# Patient Record
Sex: MALE | Race: WHITE | NOT HISPANIC OR LATINO
[De-identification: names, ages, dates, MRNs, and addresses within clinical notes are randomized per-mention and may not be internally consistent; named-entity substitution may affect disease eponyms.]

---

## 2017-12-23 ENCOUNTER — TRANSCRIPTION ENCOUNTER (OUTPATIENT)
Age: 52
End: 2017-12-23

## 2021-08-23 ENCOUNTER — FORM ENCOUNTER (OUTPATIENT)
Age: 56
End: 2021-08-23

## 2021-08-24 ENCOUNTER — TRANSCRIPTION ENCOUNTER (OUTPATIENT)
Age: 56
End: 2021-08-24

## 2021-09-13 ENCOUNTER — TRANSCRIPTION ENCOUNTER (OUTPATIENT)
Age: 56
End: 2021-09-13

## 2023-05-22 ENCOUNTER — NON-APPOINTMENT (OUTPATIENT)
Age: 58
End: 2023-05-22

## 2023-05-23 ENCOUNTER — INPATIENT (INPATIENT)
Facility: HOSPITAL | Age: 58
LOS: 2 days | Discharge: HOME | DRG: 638 | End: 2023-05-26
Attending: FAMILY MEDICINE | Admitting: FAMILY MEDICINE
Payer: COMMERCIAL

## 2023-05-23 VITALS
DIASTOLIC BLOOD PRESSURE: 79 MMHG | TEMPERATURE: 100 F | WEIGHT: 259.93 LBS | RESPIRATION RATE: 16 BRPM | HEART RATE: 73 BPM | SYSTOLIC BLOOD PRESSURE: 139 MMHG | OXYGEN SATURATION: 94 % | HEIGHT: 71 IN

## 2023-05-23 LAB
ALBUMIN SERPL ELPH-MCNC: 3.2 G/DL — LOW (ref 3.3–5)
ALP SERPL-CCNC: 65 U/L — SIGNIFICANT CHANGE UP (ref 40–120)
ALT FLD-CCNC: 41 U/L — SIGNIFICANT CHANGE UP (ref 12–78)
ANION GAP SERPL CALC-SCNC: 6 MMOL/L — SIGNIFICANT CHANGE UP (ref 5–17)
APPEARANCE UR: CLEAR — SIGNIFICANT CHANGE UP
APTT BLD: 28.8 SEC — SIGNIFICANT CHANGE UP (ref 27.5–35.5)
AST SERPL-CCNC: 26 U/L — SIGNIFICANT CHANGE UP (ref 15–37)
BASOPHILS # BLD AUTO: 0.07 K/UL — SIGNIFICANT CHANGE UP (ref 0–0.2)
BASOPHILS NFR BLD AUTO: 0.6 % — SIGNIFICANT CHANGE UP (ref 0–2)
BILIRUB SERPL-MCNC: 0.5 MG/DL — SIGNIFICANT CHANGE UP (ref 0.2–1.2)
BILIRUB UR-MCNC: NEGATIVE — SIGNIFICANT CHANGE UP
BUN SERPL-MCNC: 20 MG/DL — SIGNIFICANT CHANGE UP (ref 7–23)
CALCIUM SERPL-MCNC: 9.2 MG/DL — SIGNIFICANT CHANGE UP (ref 8.5–10.1)
CHLORIDE SERPL-SCNC: 105 MMOL/L — SIGNIFICANT CHANGE UP (ref 96–108)
CO2 SERPL-SCNC: 24 MMOL/L — SIGNIFICANT CHANGE UP (ref 22–31)
COLOR SPEC: YELLOW — SIGNIFICANT CHANGE UP
CREAT SERPL-MCNC: 1.1 MG/DL — SIGNIFICANT CHANGE UP (ref 0.5–1.3)
DIFF PNL FLD: ABNORMAL
EGFR: 79 ML/MIN/1.73M2 — SIGNIFICANT CHANGE UP
EOSINOPHIL # BLD AUTO: 0.2 K/UL — SIGNIFICANT CHANGE UP (ref 0–0.5)
EOSINOPHIL NFR BLD AUTO: 1.8 % — SIGNIFICANT CHANGE UP (ref 0–6)
GLUCOSE SERPL-MCNC: 206 MG/DL — HIGH (ref 70–99)
GLUCOSE UR QL: NEGATIVE — SIGNIFICANT CHANGE UP
HCT VFR BLD CALC: 42.8 % — SIGNIFICANT CHANGE UP (ref 39–50)
HGB BLD-MCNC: 14 G/DL — SIGNIFICANT CHANGE UP (ref 13–17)
IMM GRANULOCYTES NFR BLD AUTO: 1.2 % — HIGH (ref 0–0.9)
INR BLD: 1.18 RATIO — HIGH (ref 0.88–1.16)
KETONES UR-MCNC: NEGATIVE — SIGNIFICANT CHANGE UP
LACTATE SERPL-SCNC: 1.8 MMOL/L — SIGNIFICANT CHANGE UP (ref 0.7–2)
LEUKOCYTE ESTERASE UR-ACNC: NEGATIVE — SIGNIFICANT CHANGE UP
LYMPHOCYTES # BLD AUTO: 1.93 K/UL — SIGNIFICANT CHANGE UP (ref 1–3.3)
LYMPHOCYTES # BLD AUTO: 17.2 % — SIGNIFICANT CHANGE UP (ref 13–44)
MCHC RBC-ENTMCNC: 27.7 PG — SIGNIFICANT CHANGE UP (ref 27–34)
MCHC RBC-ENTMCNC: 32.7 GM/DL — SIGNIFICANT CHANGE UP (ref 32–36)
MCV RBC AUTO: 84.8 FL — SIGNIFICANT CHANGE UP (ref 80–100)
MONOCYTES # BLD AUTO: 1.49 K/UL — HIGH (ref 0–0.9)
MONOCYTES NFR BLD AUTO: 13.3 % — SIGNIFICANT CHANGE UP (ref 2–14)
NEUTROPHILS # BLD AUTO: 7.4 K/UL — SIGNIFICANT CHANGE UP (ref 1.8–7.4)
NEUTROPHILS NFR BLD AUTO: 65.9 % — SIGNIFICANT CHANGE UP (ref 43–77)
NITRITE UR-MCNC: NEGATIVE — SIGNIFICANT CHANGE UP
NRBC # BLD: 0 /100 WBCS — SIGNIFICANT CHANGE UP (ref 0–0)
PH UR: 6 — SIGNIFICANT CHANGE UP (ref 5–8)
PLATELET # BLD AUTO: 221 K/UL — SIGNIFICANT CHANGE UP (ref 150–400)
POTASSIUM SERPL-MCNC: 3.7 MMOL/L — SIGNIFICANT CHANGE UP (ref 3.5–5.3)
POTASSIUM SERPL-SCNC: 3.7 MMOL/L — SIGNIFICANT CHANGE UP (ref 3.5–5.3)
PROT SERPL-MCNC: 7.7 G/DL — SIGNIFICANT CHANGE UP (ref 6–8.3)
PROT UR-MCNC: 15
PROTHROM AB SERPL-ACNC: 13.8 SEC — HIGH (ref 10.5–13.4)
RBC # BLD: 5.05 M/UL — SIGNIFICANT CHANGE UP (ref 4.2–5.8)
RBC # FLD: 15.2 % — HIGH (ref 10.3–14.5)
SODIUM SERPL-SCNC: 135 MMOL/L — SIGNIFICANT CHANGE UP (ref 135–145)
SP GR SPEC: 1.01 — SIGNIFICANT CHANGE UP (ref 1.01–1.02)
UROBILINOGEN FLD QL: NEGATIVE — SIGNIFICANT CHANGE UP
WBC # BLD: 11.22 K/UL — HIGH (ref 3.8–10.5)
WBC # FLD AUTO: 11.22 K/UL — HIGH (ref 3.8–10.5)

## 2023-05-23 PROCEDURE — 99285 EMERGENCY DEPT VISIT HI MDM: CPT

## 2023-05-23 PROCEDURE — 71045 X-RAY EXAM CHEST 1 VIEW: CPT | Mod: 26

## 2023-05-23 PROCEDURE — 73590 X-RAY EXAM OF LOWER LEG: CPT | Mod: 26,RT

## 2023-05-23 PROCEDURE — 93971 EXTREMITY STUDY: CPT | Mod: 26,RT

## 2023-05-23 RX ORDER — CEFEPIME 1 G/1
2000 INJECTION, POWDER, FOR SOLUTION INTRAMUSCULAR; INTRAVENOUS ONCE
Refills: 0 | Status: COMPLETED | OUTPATIENT
Start: 2023-05-23 | End: 2023-05-23

## 2023-05-23 RX ORDER — ACETAMINOPHEN 500 MG
1000 TABLET ORAL ONCE
Refills: 0 | Status: COMPLETED | OUTPATIENT
Start: 2023-05-23 | End: 2023-05-23

## 2023-05-23 RX ORDER — SODIUM CHLORIDE 9 MG/ML
2300 INJECTION INTRAMUSCULAR; INTRAVENOUS; SUBCUTANEOUS ONCE
Refills: 0 | Status: COMPLETED | OUTPATIENT
Start: 2023-05-23 | End: 2023-05-23

## 2023-05-23 RX ADMIN — SODIUM CHLORIDE 2300 MILLILITER(S): 9 INJECTION INTRAMUSCULAR; INTRAVENOUS; SUBCUTANEOUS at 20:10

## 2023-05-23 RX ADMIN — Medication 400 MILLIGRAM(S): at 20:10

## 2023-05-23 NOTE — ED ADULT NURSE NOTE - OBJECTIVE STATEMENT
pt a/o x 4 with a calm affect c/o increasing redness and swelling to lower right extremity over 2 days.  patient states he scratched his ankle and the redness and swelling went from there.

## 2023-05-23 NOTE — ED ADULT NURSE NOTE - CADM POA PRESS ULCER
[phone]  d/w Dr. Narvaez and Dr. NORM Stout No

## 2023-05-23 NOTE — ED ADULT TRIAGE NOTE - CHIEF COMPLAINT QUOTE
Patient is a 58yo male complaining of right leg redness and pain Patient states I think I have cellulitis Patient has history of DM2 Patient has bee running a fever and has increasing redness on right leg

## 2023-05-23 NOTE — ED ADULT NURSE NOTE - NSFALLUNIVINTERV_ED_ALL_ED
Bed/Stretcher in lowest position, wheels locked, appropriate side rails in place/Call bell, personal items and telephone in reach/Instruct patient to call for assistance before getting out of bed/chair/stretcher/Non-slip footwear applied when patient is off stretcher/Marty to call system/Physically safe environment - no spills, clutter or unnecessary equipment/Purposeful proactive rounding/Room/bathroom lighting operational, light cord in reach

## 2023-05-23 NOTE — ED PROVIDER NOTE - NS ED ATTENDING STATEMENT MOD
This was a shared visit with the ENOCH. I reviewed and verified the documentation and independently performed the documented:

## 2023-05-23 NOTE — ED PROVIDER NOTE - CLINICAL SUMMARY MEDICAL DECISION MAKING FREE TEXT BOX
here with rapidly progressive cellulitis with fevers, chills. diabetic. check labs, US/XR, give antibiotics, admit.

## 2023-05-23 NOTE — ED PROVIDER NOTE - OBJECTIVE STATEMENT
57 M hx DM, HTN c/o right lower leg pain, redness. Sent from urgent care due to cellulitis. States for the past few days he had low grade fever, yesterday saw a small red spot by his right medial ankle which spread. Has been taking naproxen, last dose this morning. Admits that a few days ago he scraped his ankle on a box. 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

## 2023-05-23 NOTE — ED PROVIDER NOTE - ATTENDING APP SHARED VISIT CONTRIBUTION OF CARE
This was a shared visit with ENOCH. I reviewed and verified the documentation and independently performed the documented MDM.

## 2023-05-24 DIAGNOSIS — E78.00 PURE HYPERCHOLESTEROLEMIA, UNSPECIFIED: ICD-10-CM

## 2023-05-24 DIAGNOSIS — L03.90 CELLULITIS, UNSPECIFIED: ICD-10-CM

## 2023-05-24 DIAGNOSIS — F41.9 ANXIETY DISORDER, UNSPECIFIED: ICD-10-CM

## 2023-05-24 DIAGNOSIS — I10 ESSENTIAL (PRIMARY) HYPERTENSION: ICD-10-CM

## 2023-05-24 DIAGNOSIS — E11.9 TYPE 2 DIABETES MELLITUS WITHOUT COMPLICATIONS: ICD-10-CM

## 2023-05-24 LAB
A1C WITH ESTIMATED AVERAGE GLUCOSE RESULT: 8.3 % — HIGH (ref 4–5.6)
CRP SERPL-MCNC: 106 MG/L — HIGH
ERYTHROCYTE [SEDIMENTATION RATE] IN BLOOD: 26 MM/HR — HIGH (ref 0–20)
ESTIMATED AVERAGE GLUCOSE: 192 MG/DL — HIGH (ref 68–114)
HCT VFR BLD CALC: 42.3 % — SIGNIFICANT CHANGE UP (ref 39–50)
HGB BLD-MCNC: 13.6 G/DL — SIGNIFICANT CHANGE UP (ref 13–17)
MCHC RBC-ENTMCNC: 27.4 PG — SIGNIFICANT CHANGE UP (ref 27–34)
MCHC RBC-ENTMCNC: 32.2 GM/DL — SIGNIFICANT CHANGE UP (ref 32–36)
MCV RBC AUTO: 85.1 FL — SIGNIFICANT CHANGE UP (ref 80–100)
NRBC # BLD: 0 /100 WBCS — SIGNIFICANT CHANGE UP (ref 0–0)
PLATELET # BLD AUTO: 202 K/UL — SIGNIFICANT CHANGE UP (ref 150–400)
PROCALCITONIN SERPL-MCNC: 0.08 NG/ML — SIGNIFICANT CHANGE UP
RBC # BLD: 4.97 M/UL — SIGNIFICANT CHANGE UP (ref 4.2–5.8)
RBC # FLD: 15.4 % — HIGH (ref 10.3–14.5)
WBC # BLD: 10.91 K/UL — HIGH (ref 3.8–10.5)
WBC # FLD AUTO: 10.91 K/UL — HIGH (ref 3.8–10.5)

## 2023-05-24 PROCEDURE — 93010 ELECTROCARDIOGRAM REPORT: CPT

## 2023-05-24 PROCEDURE — 99222 1ST HOSP IP/OBS MODERATE 55: CPT

## 2023-05-24 RX ORDER — DEXTROSE 50 % IN WATER 50 %
25 SYRINGE (ML) INTRAVENOUS ONCE
Refills: 0 | Status: DISCONTINUED | OUTPATIENT
Start: 2023-05-24 | End: 2023-05-26

## 2023-05-24 RX ORDER — TRAMADOL HYDROCHLORIDE 50 MG/1
50 TABLET ORAL
Refills: 0 | Status: DISCONTINUED | OUTPATIENT
Start: 2023-05-24 | End: 2023-05-26

## 2023-05-24 RX ORDER — ACETAMINOPHEN 500 MG
650 TABLET ORAL EVERY 6 HOURS
Refills: 0 | Status: DISCONTINUED | OUTPATIENT
Start: 2023-05-24 | End: 2023-05-26

## 2023-05-24 RX ORDER — LACTOBACILLUS ACIDOPHILUS 100MM CELL
1 CAPSULE ORAL DAILY
Refills: 0 | Status: DISCONTINUED | OUTPATIENT
Start: 2023-05-24 | End: 2023-05-26

## 2023-05-24 RX ORDER — ALPRAZOLAM 0.25 MG
0.5 TABLET ORAL EVERY 8 HOURS
Refills: 0 | Status: DISCONTINUED | OUTPATIENT
Start: 2023-05-24 | End: 2023-05-26

## 2023-05-24 RX ORDER — LISINOPRIL 2.5 MG/1
2.5 TABLET ORAL DAILY
Refills: 0 | Status: DISCONTINUED | OUTPATIENT
Start: 2023-05-24 | End: 2023-05-24

## 2023-05-24 RX ORDER — LANOLIN ALCOHOL/MO/W.PET/CERES
5 CREAM (GRAM) TOPICAL AT BEDTIME
Refills: 0 | Status: DISCONTINUED | OUTPATIENT
Start: 2023-05-24 | End: 2023-05-26

## 2023-05-24 RX ORDER — DEXTROSE 50 % IN WATER 50 %
12.5 SYRINGE (ML) INTRAVENOUS ONCE
Refills: 0 | Status: DISCONTINUED | OUTPATIENT
Start: 2023-05-24 | End: 2023-05-26

## 2023-05-24 RX ORDER — LOSARTAN POTASSIUM 100 MG/1
25 TABLET, FILM COATED ORAL DAILY
Refills: 0 | Status: DISCONTINUED | OUTPATIENT
Start: 2023-05-25 | End: 2023-05-26

## 2023-05-24 RX ORDER — DEXTROSE 50 % IN WATER 50 %
15 SYRINGE (ML) INTRAVENOUS ONCE
Refills: 0 | Status: DISCONTINUED | OUTPATIENT
Start: 2023-05-24 | End: 2023-05-26

## 2023-05-24 RX ORDER — INSULIN LISPRO 100/ML
VIAL (ML) SUBCUTANEOUS AT BEDTIME
Refills: 0 | Status: DISCONTINUED | OUTPATIENT
Start: 2023-05-24 | End: 2023-05-26

## 2023-05-24 RX ORDER — TRAMADOL HYDROCHLORIDE 50 MG/1
25 TABLET ORAL
Refills: 0 | Status: DISCONTINUED | OUTPATIENT
Start: 2023-05-24 | End: 2023-05-26

## 2023-05-24 RX ORDER — ATENOLOL 25 MG/1
25 TABLET ORAL DAILY
Refills: 0 | Status: DISCONTINUED | OUTPATIENT
Start: 2023-05-24 | End: 2023-05-26

## 2023-05-24 RX ORDER — INSULIN LISPRO 100/ML
VIAL (ML) SUBCUTANEOUS
Refills: 0 | Status: DISCONTINUED | OUTPATIENT
Start: 2023-05-24 | End: 2023-05-26

## 2023-05-24 RX ORDER — GLUCAGON INJECTION, SOLUTION 0.5 MG/.1ML
1 INJECTION, SOLUTION SUBCUTANEOUS ONCE
Refills: 0 | Status: DISCONTINUED | OUTPATIENT
Start: 2023-05-24 | End: 2023-05-26

## 2023-05-24 RX ORDER — CEFAZOLIN SODIUM 1 G
1000 VIAL (EA) INJECTION EVERY 8 HOURS
Refills: 0 | Status: DISCONTINUED | OUTPATIENT
Start: 2023-05-24 | End: 2023-05-26

## 2023-05-24 RX ORDER — HEPARIN SODIUM 5000 [USP'U]/ML
5000 INJECTION INTRAVENOUS; SUBCUTANEOUS EVERY 12 HOURS
Refills: 0 | Status: DISCONTINUED | OUTPATIENT
Start: 2023-05-24 | End: 2023-05-25

## 2023-05-24 RX ORDER — ASPIRIN/CALCIUM CARB/MAGNESIUM 324 MG
81 TABLET ORAL DAILY
Refills: 0 | Status: DISCONTINUED | OUTPATIENT
Start: 2023-05-24 | End: 2023-05-26

## 2023-05-24 RX ORDER — ATORVASTATIN CALCIUM 80 MG/1
40 TABLET, FILM COATED ORAL AT BEDTIME
Refills: 0 | Status: DISCONTINUED | OUTPATIENT
Start: 2023-05-24 | End: 2023-05-26

## 2023-05-24 RX ADMIN — LISINOPRIL 2.5 MILLIGRAM(S): 2.5 TABLET ORAL at 06:42

## 2023-05-24 RX ADMIN — Medication 1: at 08:39

## 2023-05-24 RX ADMIN — ATORVASTATIN CALCIUM 40 MILLIGRAM(S): 80 TABLET, FILM COATED ORAL at 22:19

## 2023-05-24 RX ADMIN — Medication 81 MILLIGRAM(S): at 11:50

## 2023-05-24 RX ADMIN — Medication 100 MILLIGRAM(S): at 09:08

## 2023-05-24 RX ADMIN — Medication 0.5 MILLIGRAM(S): at 22:19

## 2023-05-24 RX ADMIN — HEPARIN SODIUM 5000 UNIT(S): 5000 INJECTION INTRAVENOUS; SUBCUTANEOUS at 17:03

## 2023-05-24 RX ADMIN — ATENOLOL 25 MILLIGRAM(S): 25 TABLET ORAL at 17:02

## 2023-05-24 RX ADMIN — Medication 1: at 17:24

## 2023-05-24 RX ADMIN — Medication 5 MILLIGRAM(S): at 02:00

## 2023-05-24 RX ADMIN — Medication 1 TABLET(S): at 11:06

## 2023-05-24 RX ADMIN — CEFEPIME 100 MILLIGRAM(S): 1 INJECTION, POWDER, FOR SOLUTION INTRAMUSCULAR; INTRAVENOUS at 01:02

## 2023-05-24 RX ADMIN — Medication 100 MILLIGRAM(S): at 17:05

## 2023-05-24 NOTE — CARE COORDINATION ASSESSMENT. - NSCAREPROVIDERS_GEN_ALL_CORE_FT
CARE PROVIDERS:  Accepting Physician: Zander Ma  Access Services: Jose De Jesus Fry  Admitting: Zander Ma  Attending: Zander Ma  Case Management: Stephen Shrestha  Consultant: Perlman, Craig  Consultant: Georgina Montes De Oca  Consultant: Weil, Patricia  Consultant: Perlman, Daryl  Consultant: Claire Serna  Consultant: Jabier Quiles  Covering Nurse: Frank Whitman  Covering Team: Chelsey Wang  Covering Team: Perlman, Daryl  ED ACP: Aleyda Arora  ED Attending: Louann Andrews  ED Nurse: Tone Nobles  Nurse: Shelia Crowe  Nurse: Rosa Newby  Nurse: Mat Vieyra  Nurse: Nicole Reynolds  Ordered: ADM, User  Ordered: Doctor, Unknown  Ordered: ServiceAccount, SCMMLM  Ordered: ServiceAccount, SCMMLM  Outpatient Provider: Leonidas Young  Override: Shelia Crowe  Override: Rosa Newby  PCA/Nursing Assistant: Laura Velazco  Primary Team: Zander Ma  Registered Dietitian: Megan Santa  : Dona Love

## 2023-05-24 NOTE — CONSULT NOTE ADULT - SUBJECTIVE AND OBJECTIVE BOX
[facility]  INFECTIOUS DISEASES   [address] [place] [address]  Tel: [phone]     Fax: [phone]  ========================================================  MD Alvin Acevedo Kaushal, MD Cho, Michelle, MD Sunjit, Jaspal, MD  ========================================================    [id number]  KAREN VAZ     CC: Patient is a 57y old  Male who presents with a chief complaint of   HPI:    PAST MEDICAL & SURGICAL HISTORY:    Social Hx: No smoking, EtOH or drugs     FAMILY HISTORY: Noncontributory     Allergies  No Known Allergies    Antibiotics:  MEDICATIONS  (STANDING):  ceFAZolin   IVPB 1000 milliGRAM(s) IV Intermittent every 8 hours  dextrose 50% Injectable 25 Gram(s) IV Push once  dextrose 50% Injectable 12.5 Gram(s) IV Push once  dextrose 50% Injectable 25 Gram(s) IV Push once  dextrose Oral Gel 15 Gram(s) Oral once  glucagon  Injectable 1 milliGRAM(s) IntraMuscular once  heparin   Injectable 5000 Unit(s) SubCutaneous every 12 hours  insulin lispro (ADMELOG) corrective regimen sliding scale   SubCutaneous three times a day before meals  insulin lispro (ADMELOG) corrective regimen sliding scale   SubCutaneous at bedtime  lactobacillus acidophilus 1 Tablet(s) Oral daily  lisinopril 2.5 milliGRAM(s) Oral daily  melatonin 5 milliGRAM(s) Oral at bedtime    MEDICATIONS  (PRN):  acetaminophen     Tablet .. 650 milliGRAM(s) Oral every 6 hours PRN Temp greater or equal to 38C (100.4F)  traMADol 25 milliGRAM(s) Oral four times a day PRN Mild Pain (1 - 3)  traMADol 50 milliGRAM(s) Oral four times a day PRN Moderate Pain (4 - 6)     REVIEW OF SYSTEMS:  CONSTITUTIONAL:  No Fever or chills  HEENT:  No diplopia or blurred vision.  No sore throat or runny nose.  CARDIOVASCULAR:  No chest pain or SOB.  RESPIRATORY:  No cough, shortness of breath, PND or orthopnea.  GASTROINTESTINAL:  No nausea, vomiting or diarrhea.  GENITOURINARY:  No dysuria, frequency or urgency. No Blood in urine  MUSCULOSKELETAL:  no joint aches, no muscle pain  SKIN:  No change in skin, hair or nails.  NEUROLOGIC:  No paresthesias or weakness.  PSYCHIATRIC:  No disorder of thought or mood.  ENDOCRINE:  No heat or cold intolerance, polyuria or polydipsia.  HEMATOLOGICAL:  No easy bruising or bleeding.     Physical Exam:  Vital Signs Last 24 Hrs  T(C): 36.8 (24 May 2023 04:47), Max: 37.8 (23 May 2023 17:41)  T(F): 98.2 (24 May 2023 04:47), Max: 100.1 (23 May 2023 17:41)  HR: 62 (24 May 2023 04:47) (62 - 76)  BP: 124/75 (24 May 2023 04:47) (124/75 - 143/99)  RR: 16 (24 May 2023 04:47) (16 - 16)  SpO2: 93% (24 May 2023 04:47) (93% - 98%)  Parameters below as of 24 May 2023 04:47  Patient On (Oxygen Delivery Method): room air  Height (cm): 180.3 ( @ 17:41)  Weight (kg): 117.9 ( @ 17:41)  BMI (kg/m2): 36.3 ( @ 17:41)  BSA (m2): 2.36 ( @ 17:41)  GEN: NAD  HEENT: normocephalic and atraumatic. EOMI. PERRL.    NECK: Supple.  No lymphadenopathy   LUNGS: Clear to auscultation.  HEART: Regular rate and rhythm without murmur.  ABDOMEN: Soft, nontender, and nondistended.  Positive bowel sounds.    : No CVA tenderness  EXTREMITIES: Without edema.  NEUROLOGIC: grossly intact.  PSYCHIATRIC: Appropriate affect .  SKIN: No rash     Labs:      135  |  105  |  20  ----------------------------<  206<H>  3.7   |  24  |  1.10    Ca    9.2      23 May 2023 19:58    TPro  7.7  /  Alb  3.2<L>  /  TBili  0.5  /  DBili  x   /  AST  26  /  ALT  41  /  AlkPhos  65                          13.6   10.91 )-----------( 202      ( 24 May 2023 06:25 )             42.3     PT/INR - ( 23 May 2023 19:58 )   PT: 13.8 sec;   INR: 1.18 ratio    PTT - ( 23 May 2023 19:58 )  PTT:28.8 sec  Urinalysis Basic - ( 23 May 2023 22:30 )    Color: Yellow / Appearance: Clear / S.010 / pH: x  Gluc: x / Ketone: Negative  / Bili: Negative / Urobili: Negative   Blood: x / Protein: 15 / Nitrite: Negative   Leuk Esterase: Negative / RBC: 3-5 /HPF / WBC 0-2   Sq Epi: x / Non Sq Epi: x / Bacteria: Occasional    LIVER FUNCTIONS - ( 23 May 2023 19:58 )  Alb: 3.2 g/dL / Pro: 7.7 g/dL / ALK PHOS: 65 U/L / ALT: 41 U/L / AST: 26 U/L / GGT: x           Procalcitonin, Serum: 0.08 ng/mL (23 @ 06:25)    Sedimentation Rate, Erythrocyte: 26 mm/hr (23 @ 06:25)    All imaging and other data have been reviewed.  < from: US Duplex Venous Lower Ext Ltd, Right (23 @ 21:56) >  IMPRESSION:  No evidence of right lower extremity deep venous thrombosis.    Assessment and Plan:       Thank you for courtesy of this consult.     Will follow.  Discussed with the primary team.     Jabier Quiles MD  Division of Infectious Diseases   Please call ID service at [phone] with any question.    75 minutes spent on total encounter assessing patient, examination, chart review, counseling and coordinating care by the attending physician/nurse/care manager.   [facility]  INFECTIOUS DISEASES   [address] [place] [address]  Tel: [phone]     Fax: [phone]  ========================================================  MD Alvin Acevedo Kaushal, MD Cho, Michelle, MD Sunjit, Jaspal, MD  ========================================================    [id number]  KAREN VAZ     CC: right leg swelling and redness   HPI: 58yo man with PMH of HTN and DM2 was admitted on  with right lower leg cellulitis.   He had small red spot in right inner ankle about 3-4 days ago that started spreading, no trauma or any open wound. Also had low grade fever in last few days. No other symptoms.  No chest pain, cough, headache, nausea, vomiting, diarrhea, dysuria or any other symptoms. Never had cellulitis in his leg, never been diagnosed with MRSA in the past.  In last few weeks noticed redness and irritation in inguinal area "jock itch" for which not using any treatment. No other skin lesions.   In ED was started on cefazolin and ID was called for further recommendations.     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, social EtOH, no drugs, owns a  company     FAMILY HISTORY: Noncontributory     Allergies  No Known Allergies    Antibiotics:  MEDICATIONS  (STANDING):  ceFAZolin   IVPB 1000 milliGRAM(s) IV Intermittent every 8 hours  dextrose 50% Injectable 25 Gram(s) IV Push once  dextrose 50% Injectable 12.5 Gram(s) IV Push once  dextrose 50% Injectable 25 Gram(s) IV Push once  dextrose Oral Gel 15 Gram(s) Oral once  glucagon  Injectable 1 milliGRAM(s) IntraMuscular once  heparin   Injectable 5000 Unit(s) SubCutaneous every 12 hours  insulin lispro (ADMELOG) corrective regimen sliding scale   SubCutaneous three times a day before meals  insulin lispro (ADMELOG) corrective regimen sliding scale   SubCutaneous at bedtime  lactobacillus acidophilus 1 Tablet(s) Oral daily  lisinopril 2.5 milliGRAM(s) Oral daily  melatonin 5 milliGRAM(s) Oral at bedtime    MEDICATIONS  (PRN):  acetaminophen     Tablet .. 650 milliGRAM(s) Oral every 6 hours PRN Temp greater or equal to 38C (100.4F)  traMADol 25 milliGRAM(s) Oral four times a day PRN Mild Pain (1 - 3)  traMADol 50 milliGRAM(s) Oral four times a day PRN Moderate Pain (4 - 6)     REVIEW OF SYSTEMS:  CONSTITUTIONAL:  No Fever or chills  HEENT:  No diplopia or blurred vision.  No sore throat or runny nose.  CARDIOVASCULAR:  No chest pain or SOB.  RESPIRATORY:  No cough, shortness of breath, PND or orthopnea.  GASTROINTESTINAL:  No nausea, vomiting or diarrhea.  GENITOURINARY:  No dysuria, frequency or urgency. No Blood in urine  MUSCULOSKELETAL:  no joint aches, no muscle pain  SKIN:  No change in skin, hair or nails.  NEUROLOGIC:  No paresthesias or weakness.  PSYCHIATRIC:  No disorder of thought or mood.  ENDOCRINE:  No heat or cold intolerance, polyuria or polydipsia.  HEMATOLOGICAL:  No easy bruising or bleeding.     Physical Exam:  Vital Signs Last 24 Hrs  T(C): 36.8 (24 May 2023 04:47), Max: 37.8 (23 May 2023 17:41)  T(F): 98.2 (24 May 2023 04:47), Max: 100.1 (23 May 2023 17:41)  HR: 62 (24 May 2023 04:47) (62 - 76)  BP: 124/75 (24 May 2023 04:47) (124/75 - 143/99)  RR: 16 (24 May 2023 04:47) (16 - 16)  SpO2: 93% (24 May 2023 04:47) (93% - 98%)  Parameters below as of 24 May 2023 04:47  Patient On (Oxygen Delivery Method): room air  Height (cm): 180.3 ( @ 17:41)  Weight (kg): 117.9 ( @ 17:41)  BMI (kg/m2): 36.3 ( @ 17:41)  BSA (m2): 2.36 ( @ 17:41)  GEN: NAD, obese   HEENT: normocephalic and atraumatic. EOMI. PERRL.    NECK: Supple.  No lymphadenopathy   LUNGS: Clear to auscultation.  HEART: Regular rate and rhythm without murmur.  ABDOMEN: Soft, nontender, and nondistended.  Positive bowel sounds.    : No CVA tenderness  EXTREMITIES: Right lower leg with erythema, warmth and swelling, mild tenderness  NEUROLOGIC: grossly intact.  PSYCHIATRIC: Appropriate affect .  SKIN: as above in legs, Inguinal area with erythema, no open ulcer, small areas with maceration     Labs:      135  |  105  |  20  ----------------------------<  206<H>  3.7   |  24  |  1.10    Ca    9.2      23 May 2023 19:58    TPro  7.7  /  Alb  3.2<L>  /  TBili  0.5  /  DBili  x   /  AST  26  /  ALT  41  /  AlkPhos  65                          13.6   10.91 )-----------( 202      ( 24 May 2023 06:25 )             42.3     PT/INR - ( 23 May 2023 19:58 )   PT: 13.8 sec;   INR: 1.18 ratio    PTT - ( 23 May 2023 19:58 )  PTT:28.8 sec  Urinalysis Basic - ( 23 May 2023 22:30 )    Color: Yellow / Appearance: Clear / S.010 / pH: x  Gluc: x / Ketone: Negative  / Bili: Negative / Urobili: Negative   Blood: x / Protein: 15 / Nitrite: Negative   Leuk Esterase: Negative / RBC: 3-5 /HPF / WBC 0-2   Sq Epi: x / Non Sq Epi: x / Bacteria: Occasional    LIVER FUNCTIONS - ( 23 May 2023 19:58 )  Alb: 3.2 g/dL / Pro: 7.7 g/dL / ALK PHOS: 65 U/L / ALT: 41 U/L / AST: 26 U/L / GGT: x           Procalcitonin, Serum: 0.08 ng/mL (23 @ 06:25)    Sedimentation Rate, Erythrocyte: 26 mm/hr (23 @ 06:25)    All imaging and other data have been reviewed.  < from: US Duplex Venous Lower Ext Ltd, Right (23 @ 21:56) >  IMPRESSION:  No evidence of right lower extremity deep venous thrombosis.    Assessment and Plan:   58yo man with PMH of HTN and DM2 was admitted on  with right lower leg cellulitis.   Source could be skin break in feet or inguinal area that has rash causing cellulitis in leg. Will cover common bacteria MSSA and Sterp.     1- RLE cellulitis  - Will follow Cultures   - Will Monitor Tmax and WBC  - Will continue cefazolin, will increase to 2gm q8    2- Tinea cruris   - keep the area clean  - One dose of oral fluconazole 150mg (can repeat weekly for 3 weeks)  - Topical antifungal bid     Thank you for courtesy of this consult.     Will follow.    Jabier Quiles MD  Division of Infectious Diseases   Please call ID service at [phone] with any question.    75 minutes spent on total encounter assessing patient, examination, chart review, counseling and coordinating care by the attending physician/nurse/care manager.

## 2023-05-24 NOTE — CARE COORDINATION ASSESSMENT. - PATIENT'S REACTION TO HEALTH STATUS
s/p stepped on a amy nail yesterday on L foot. removed the entire nail. denies fevers/ chills.
accepting

## 2023-05-24 NOTE — CONSULT NOTE ADULT - PROBLEM SELECTOR RECOMMENDATION 9
cont low dose admelog corrective scale coverage qac/qhs  cont cons cho diet  goal bg 100-180 in hosp setting

## 2023-05-24 NOTE — CARE COORDINATION ASSESSMENT. - OTHER PERTINENT DISCHARGE PLANNING INFORMATION:
SW met with this pt at bedside discussed name role elos and dc planning. Pt from home, independent, admitted for Cellulitis. 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 Pt has 3 steps to enter his home and a full flight to the bedrooms. SBIRT completed, no anticipated DC needs at this time, SW remains available.

## 2023-05-24 NOTE — H&P ADULT - NSHPPHYSICALEXAM_GEN_ALL_CORE
gen no overt distress  heent nl  neck supple, no jvd  lungs cta  cor rrr s1s2  abd soft nt  neuro a and o x 3, non focal  ext no edema  derm rle well circumscribed area of erythema, tenderness, warmth

## 2023-05-24 NOTE — H&P ADULT - HISTORY OF PRESENT ILLNESS
57 M hx DM, HTN c/o right lower leg pain, redness. Sent from urgent care due to cellulitis. States for the past few days he had low grade fever, yesterday saw a small red spot by his right medial ankle which spread. Has been taking naproxen, last dose this morning. Admits that a few days ago he scraped his ankle on a box. 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

## 2023-05-24 NOTE — CONSULT NOTE ADULT - SUBJECTIVE AND OBJECTIVE BOX
Patient is a 57y old  Male who presents with a chief complaint of     Reason For Consult:     HPI:  57 M hx DM, HTN c/o right lower leg pain, redness. Sent from urgent care due to cellulitis. States for the past few days he had low grade fever, yesterday saw a small red spot by his right medial ankle which spread. Has been taking naproxen, last dose this morning. Admits that a few days ago he scraped his ankle on a box. 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 (24 May 2023 11:04)      PAST MEDICAL & SURGICAL HISTORY:  Type 2 diabetes mellitus      HTN (hypertension)      Dyslipidemia          FAMILY HISTORY:        Social History:    MEDICATIONS  (STANDING):  aspirin enteric coated 81 milliGRAM(s) Oral daily  atenolol  Tablet 25 milliGRAM(s) Oral daily  atorvastatin 40 milliGRAM(s) Oral at bedtime  ceFAZolin   IVPB 1000 milliGRAM(s) IV Intermittent every 8 hours  dextrose 50% Injectable 25 Gram(s) IV Push once  dextrose 50% Injectable 12.5 Gram(s) IV Push once  dextrose 50% Injectable 25 Gram(s) IV Push once  dextrose Oral Gel 15 Gram(s) Oral once  glucagon  Injectable 1 milliGRAM(s) IntraMuscular once  heparin   Injectable 5000 Unit(s) SubCutaneous every 12 hours  insulin lispro (ADMELOG) corrective regimen sliding scale   SubCutaneous three times a day before meals  insulin lispro (ADMELOG) corrective regimen sliding scale   SubCutaneous at bedtime  lactobacillus acidophilus 1 Tablet(s) Oral daily  melatonin 5 milliGRAM(s) Oral at bedtime    MEDICATIONS  (PRN):  acetaminophen     Tablet .. 650 milliGRAM(s) Oral every 6 hours PRN Temp greater or equal to 38C (100.4F)  ALPRAZolam 0.5 milliGRAM(s) Oral every 8 hours PRN anxiety and/or sleep  traMADol 25 milliGRAM(s) Oral four times a day PRN Mild Pain (1 - 3)  traMADol 50 milliGRAM(s) Oral four times a day PRN Moderate Pain (4 - 6)        T(C): 37.7 (05-24-23 @ 21:24), Max: 37.7 (05-24-23 @ 21:24)  HR: 60 (05-24-23 @ 21:24) (60 - 78)  BP: 132/86 (05-24-23 @ 21:24) (124/75 - 148/93)  RR: 18 (05-24-23 @ 21:24) (16 - 18)  SpO2: 96% (05-24-23 @ 21:24) (93% - 98%)  Wt(kg): --    PHYSICAL EXAM:  GENERAL: NAD, well-groomed, well-developed  HEAD:  Atraumatic, Normocephalic  NECK: Supple, No JVD, Normal thyroid  CHEST/LUNG: Clear to percussion bilaterally; No rales, rhonchi, wheezing, or rubs  HEART: Regular rate and rhythm; No murmurs, rubs, or gallops  ABDOMEN: Soft, Nontender, Nondistended; Bowel sounds present  EXTREMITIES:  2+ Peripheral Pulses, No clubbing, cyanosis, or edema  SKIN: No rashes or lesions    CAPILLARY BLOOD GLUCOSE      POCT Blood Glucose.: 147 mg/dL (24 May 2023 21:31)  POCT Blood Glucose.: 170 mg/dL (24 May 2023 17:19)  POCT Blood Glucose.: 146 mg/dL (24 May 2023 12:29)  POCT Blood Glucose.: 167 mg/dL (24 May 2023 08:04)  POCT Blood Glucose.: 136 mg/dL (24 May 2023 01:08)                            13.6   10.91 )-----------( 202      ( 24 May 2023 06:25 )             42.3       CMP:  05-23 @ 19:58  SGPT 41  Albumin 3.2   Alk Phos 65   Anion Gap 6   SGOT 26   Total Bili 0.5   BUN 20   Calcium Total 9.2   CO2 24   Chloride 105   Creatinine 1.10   eGFR if AA --   eGFR if non AA --   Glucose 206   Potassium 3.7   Protein 7.7   Sodium 135      Thyroid Function Tests:      Diabetes Tests:       Radiology:

## 2023-05-24 NOTE — CARE COORDINATION ASSESSMENT. - NSPASTMEDSURGHISTORY_GEN_ALL_CORE_FT
PAST MEDICAL & SURGICAL HISTORY:  Dyslipidemia      HTN (hypertension)      Type 2 diabetes mellitus

## 2023-05-25 DIAGNOSIS — E11.9 TYPE 2 DIABETES MELLITUS WITHOUT COMPLICATIONS: ICD-10-CM

## 2023-05-25 LAB
ANION GAP SERPL CALC-SCNC: 8 MMOL/L — SIGNIFICANT CHANGE UP (ref 5–17)
BASOPHILS # BLD AUTO: 0.11 K/UL — SIGNIFICANT CHANGE UP (ref 0–0.2)
BASOPHILS NFR BLD AUTO: 1.2 % — SIGNIFICANT CHANGE UP (ref 0–2)
BUN SERPL-MCNC: 11 MG/DL — SIGNIFICANT CHANGE UP (ref 7–23)
CALCIUM SERPL-MCNC: 9.4 MG/DL — SIGNIFICANT CHANGE UP (ref 8.5–10.1)
CHLORIDE SERPL-SCNC: 107 MMOL/L — SIGNIFICANT CHANGE UP (ref 96–108)
CO2 SERPL-SCNC: 22 MMOL/L — SIGNIFICANT CHANGE UP (ref 22–31)
CREAT SERPL-MCNC: 0.69 MG/DL — SIGNIFICANT CHANGE UP (ref 0.5–1.3)
CULTURE RESULTS: NO GROWTH — SIGNIFICANT CHANGE UP
EGFR: 109 ML/MIN/1.73M2 — SIGNIFICANT CHANGE UP
EOSINOPHIL # BLD AUTO: 0.28 K/UL — SIGNIFICANT CHANGE UP (ref 0–0.5)
EOSINOPHIL NFR BLD AUTO: 2.9 % — SIGNIFICANT CHANGE UP (ref 0–6)
GLUCOSE SERPL-MCNC: 148 MG/DL — HIGH (ref 70–99)
HCT VFR BLD CALC: 44.4 % — SIGNIFICANT CHANGE UP (ref 39–50)
HGB BLD-MCNC: 14.3 G/DL — SIGNIFICANT CHANGE UP (ref 13–17)
IMM GRANULOCYTES NFR BLD AUTO: 3 % — HIGH (ref 0–0.9)
LYMPHOCYTES # BLD AUTO: 2.78 K/UL — SIGNIFICANT CHANGE UP (ref 1–3.3)
LYMPHOCYTES # BLD AUTO: 29.1 % — SIGNIFICANT CHANGE UP (ref 13–44)
MCHC RBC-ENTMCNC: 27.7 PG — SIGNIFICANT CHANGE UP (ref 27–34)
MCHC RBC-ENTMCNC: 32.2 GM/DL — SIGNIFICANT CHANGE UP (ref 32–36)
MCV RBC AUTO: 85.9 FL — SIGNIFICANT CHANGE UP (ref 80–100)
MONOCYTES # BLD AUTO: 1.41 K/UL — HIGH (ref 0–0.9)
MONOCYTES NFR BLD AUTO: 14.8 % — HIGH (ref 2–14)
NEUTROPHILS # BLD AUTO: 4.67 K/UL — SIGNIFICANT CHANGE UP (ref 1.8–7.4)
NEUTROPHILS NFR BLD AUTO: 49 % — SIGNIFICANT CHANGE UP (ref 43–77)
NRBC # BLD: 0 /100 WBCS — SIGNIFICANT CHANGE UP (ref 0–0)
PLATELET # BLD AUTO: 237 K/UL — SIGNIFICANT CHANGE UP (ref 150–400)
POTASSIUM SERPL-MCNC: 3.8 MMOL/L — SIGNIFICANT CHANGE UP (ref 3.5–5.3)
POTASSIUM SERPL-SCNC: 3.8 MMOL/L — SIGNIFICANT CHANGE UP (ref 3.5–5.3)
RBC # BLD: 5.17 M/UL — SIGNIFICANT CHANGE UP (ref 4.2–5.8)
RBC # FLD: 15.1 % — HIGH (ref 10.3–14.5)
SODIUM SERPL-SCNC: 137 MMOL/L — SIGNIFICANT CHANGE UP (ref 135–145)
SPECIMEN SOURCE: SIGNIFICANT CHANGE UP
WBC # BLD: 9.54 K/UL — SIGNIFICANT CHANGE UP (ref 3.8–10.5)
WBC # FLD AUTO: 9.54 K/UL — SIGNIFICANT CHANGE UP (ref 3.8–10.5)

## 2023-05-25 PROCEDURE — 99222 1ST HOSP IP/OBS MODERATE 55: CPT

## 2023-05-25 PROCEDURE — 99233 SBSQ HOSP IP/OBS HIGH 50: CPT

## 2023-05-25 RX ORDER — METFORMIN HYDROCHLORIDE 850 MG/1
2 TABLET ORAL
Qty: 120 | Refills: 0
Start: 2023-05-25

## 2023-05-25 RX ORDER — METFORMIN HYDROCHLORIDE 850 MG/1
500 TABLET ORAL
Refills: 0 | Status: DISCONTINUED | OUTPATIENT
Start: 2023-05-25 | End: 2023-05-26

## 2023-05-25 RX ORDER — HEPARIN SODIUM 5000 [USP'U]/ML
5000 INJECTION INTRAVENOUS; SUBCUTANEOUS EVERY 8 HOURS
Refills: 0 | Status: DISCONTINUED | OUTPATIENT
Start: 2023-05-25 | End: 2023-05-26

## 2023-05-25 RX ORDER — DULAGLUTIDE 4.5 MG/.5ML
0.75 INJECTION, SOLUTION SUBCUTANEOUS
Qty: 5 | Refills: 1
Start: 2023-05-25

## 2023-05-25 RX ADMIN — HEPARIN SODIUM 5000 UNIT(S): 5000 INJECTION INTRAVENOUS; SUBCUTANEOUS at 13:53

## 2023-05-25 RX ADMIN — Medication 100 MILLIGRAM(S): at 01:30

## 2023-05-25 RX ADMIN — METFORMIN HYDROCHLORIDE 500 MILLIGRAM(S): 850 TABLET ORAL at 17:29

## 2023-05-25 RX ADMIN — Medication 100 MILLIGRAM(S): at 17:28

## 2023-05-25 RX ADMIN — Medication 100 MILLIGRAM(S): at 09:40

## 2023-05-25 RX ADMIN — ATENOLOL 25 MILLIGRAM(S): 25 TABLET ORAL at 09:32

## 2023-05-25 RX ADMIN — Medication 1 TABLET(S): at 11:44

## 2023-05-25 RX ADMIN — LOSARTAN POTASSIUM 25 MILLIGRAM(S): 100 TABLET, FILM COATED ORAL at 06:35

## 2023-05-25 RX ADMIN — HEPARIN SODIUM 5000 UNIT(S): 5000 INJECTION INTRAVENOUS; SUBCUTANEOUS at 06:35

## 2023-05-25 RX ADMIN — HEPARIN SODIUM 5000 UNIT(S): 5000 INJECTION INTRAVENOUS; SUBCUTANEOUS at 21:00

## 2023-05-25 RX ADMIN — Medication 5 MILLIGRAM(S): at 22:12

## 2023-05-25 RX ADMIN — Medication 81 MILLIGRAM(S): at 11:44

## 2023-05-25 RX ADMIN — ATORVASTATIN CALCIUM 40 MILLIGRAM(S): 80 TABLET, FILM COATED ORAL at 21:00

## 2023-05-25 RX ADMIN — Medication 0.5 MILLIGRAM(S): at 22:12

## 2023-05-25 NOTE — CONSULT NOTE ADULT - PROBLEM SELECTOR RECOMMENDATION 9
Type 2  A1c 8.3% adm cellitis  Recommend endocrine-Perlman on consult  FU appt: TBA  DSC recommendations: return to home regimen and glucose monitoring  diabetes education provided  Diabetes support info and cell # [phone] given   Goal 100-180 mg/dL; 140-180 mg/dL in critical care areas Type 2  A1c 8.3% adm cellulitis RLE  Recommend endocrine-Perlman on consult  Rx to outpatient pharmacy CGM, Trulicity awaiting approval.   FU appt: TBA  DSC recommendations: return to home with modified diabetes medication regimen and glucose monitoring  diabetes education provided  Diabetes support info and cell # [phone] given   Goal 100-180 mg/dL; 140-180 mg/dL in critical care areas

## 2023-05-25 NOTE — CONSULT NOTE ADULT - SUBJECTIVE AND OBJECTIVE BOX
Patient is a 57y old  Male who presents with a chief complaint of Cellulitis     (25 May 2023 13:48)    Type: DX year known complications Endocrine Last seen Rx home Hx DKA/HHS, Glucometer checks, needs, weight, diet, exercise  diabetes education provided  Hx ASCVD, CKD, HF    HPI:  57 M hx DM, HTN c/o right lower leg pain, redness. Sent from urgent care due to cellulitis. States for the past few days he had low grade fever, yesterday saw a small red spot by his right medial ankle which spread. Has been taking naproxen, last dose this morning. Admits that a few days ago he scraped his ankle on a box. Works at [employer] but denies possibility of chemical exposure to his leg (24 May 2023 11:04)      PAST MEDICAL & SURGICAL HISTORY:  Type 2 diabetes mellitus      HTN (hypertension)      Dyslipidemia          Allergies    No Known Allergies    Intolerances        MEDICATIONS  (STANDING):  aspirin enteric coated 81 milliGRAM(s) Oral daily  atenolol  Tablet 25 milliGRAM(s) Oral daily  atorvastatin 40 milliGRAM(s) Oral at bedtime  ceFAZolin   IVPB 1000 milliGRAM(s) IV Intermittent every 8 hours  dextrose 50% Injectable 25 Gram(s) IV Push once  dextrose 50% Injectable 12.5 Gram(s) IV Push once  dextrose 50% Injectable 25 Gram(s) IV Push once  dextrose Oral Gel 15 Gram(s) Oral once  glucagon  Injectable 1 milliGRAM(s) IntraMuscular once  heparin   Injectable 5000 Unit(s) SubCutaneous every 8 hours  insulin lispro (ADMELOG) corrective regimen sliding scale   SubCutaneous three times a day before meals  insulin lispro (ADMELOG) corrective regimen sliding scale   SubCutaneous at bedtime  lactobacillus acidophilus 1 Tablet(s) Oral daily  losartan 25 milliGRAM(s) Oral daily  melatonin 5 milliGRAM(s) Oral at bedtime  metFORMIN 500 milliGRAM(s) Oral two times a day       Patient is a 57y old  Male who presents with a chief complaint of Cellulitis     (25 May 2023 13:48)    Type:2 DX 2 years ago. a1c 8.3% (usually 7-8%). known complications: cellulitis RLE No Endocrine- PCP [facility]. Home rx: metformin 500 mg BID, no glucometer, states needs to lose 40 lbs. Interested in CGM sensor, GLP1 medication to help with diabetes. diabetes education provided verbally and handouts.       HPI:  57 M hx DM, HTN c/o right lower leg pain, redness. Sent from urgent care due to cellulitis. States for the past few days he had low grade fever, yesterday saw a small red spot by his right medial ankle which spread. Has been taking naproxen, last dose this morning. Admits that a few days ago he scraped his ankle on a box. Works at [employer] but denies possibility of chemical exposure to his leg (24 May 2023 11:04)      PAST MEDICAL & SURGICAL HISTORY:  Type 2 diabetes mellitus      HTN (hypertension)      Dyslipidemia          Allergies    No Known Allergies    Intolerances        MEDICATIONS  (STANDING):  aspirin enteric coated 81 milliGRAM(s) Oral daily  atenolol  Tablet 25 milliGRAM(s) Oral daily  atorvastatin 40 milliGRAM(s) Oral at bedtime  ceFAZolin   IVPB 1000 milliGRAM(s) IV Intermittent every 8 hours  dextrose 50% Injectable 25 Gram(s) IV Push once  dextrose 50% Injectable 12.5 Gram(s) IV Push once  dextrose 50% Injectable 25 Gram(s) IV Push once  dextrose Oral Gel 15 Gram(s) Oral once  glucagon  Injectable 1 milliGRAM(s) IntraMuscular once  heparin   Injectable 5000 Unit(s) SubCutaneous every 8 hours  insulin lispro (ADMELOG) corrective regimen sliding scale   SubCutaneous three times a day before meals  insulin lispro (ADMELOG) corrective regimen sliding scale   SubCutaneous at bedtime  lactobacillus acidophilus 1 Tablet(s) Oral daily  losartan 25 milliGRAM(s) Oral daily  melatonin 5 milliGRAM(s) Oral at bedtime  metFORMIN 500 milliGRAM(s) Oral two times a day

## 2023-05-25 NOTE — DIETITIAN INITIAL EVALUATION ADULT - OTHER INFO
57 M hx DM, HTN c/o right lower leg pain, redness. Sent from urgent care due to cellulitis.     Pt A+Ox4 during visit. Consistent Carbohydrate diet rx. Well tolerated with good appetite/po intake; % per EMR. GI wdl. BM 5/25. Per pt trys to watch sugars in his diet with hx DM. Avoids soda and tries to limit while carbohydrate. Good intake fruits/vegetables/protein. Admits to using salt shaker at times. Past Hgba1c low 7's per pt (~6mos ago). Current Hgba1c 8.3%(5/24). On Metformin pta. Endo following. Stated UBW 260lbs. Current 266lbs(5/24), right leg 2+edema. When discussing weight pt commented "I know I need to lose 40lbs."  Written and verbal DM, heart healthy and weight loss nutrition therapy provided with good understanding. RDs name/phone number left with patient if questions/concerns arise.  57 M hx DM, HTN c/o right lower leg pain, redness. Sent from urgent care due to cellulitis.     Pt A+Ox4 during visit. Consistent Carbohydrate diet rx. Well tolerated with good appetite/po intake; % per EMR. GI wdl. BM 5/25. Per pt trys to watch sugars in his diet with hx type 2 DM. Avoids soda and tries to limit portion of white carbohydrate. Good intake fruits/vegetables/protein. Admits to using salt shaker at times. Past Hgba1c low 7's per pt (~6mos ago). Current Hgba1c 8.3%(5/24). On Metformin pta. Endo following. Stated UBW 260lbs. Current 266lbs(5/24), right leg 2+edema. When discussing weight pt commented "I know I need to lose 40lbs." Written and verbal DM, heart healthy and weight loss nutrition therapy provided with good understanding. RDs name/phone number left with patient if questions/concerns arise.

## 2023-05-25 NOTE — CONSULT NOTE ADULT - ASSESSMENT
Physical Exam:   Vital Signs Last 24 Hrs  T(C): 36.8 (25 May 2023 12:58), Max: 37.7 (24 May 2023 21:24)  T(F): 98.2 (25 May 2023 12:58), Max: 99.8 (24 May 2023 21:24)  HR: 54 (25 May 2023 12:58) (54 - 78)  BP: 144/92 (25 May 2023 12:58) (127/80 - 151/92)  BP(mean): --  RR: 18 (25 May 2023 12:58) (16 - 18)  SpO2: 93% (25 May 2023 12:58) (93% - 96%)    Parameters below as of 25 May 2023 12:58  Patient On (Oxygen Delivery Method): room air             CAPILLARY BLOOD GLUCOSE      POCT Blood Glucose.: 148 mg/dL (25 May 2023 12:22)  POCT Blood Glucose.: 142 mg/dL (25 May 2023 08:20)  POCT Blood Glucose.: 147 mg/dL (24 May 2023 21:31)  POCT Blood Glucose.: 170 mg/dL (24 May 2023 17:19)      Cholesterol, Serum: 113 mg/dL (05.19.21 @ 08:36)     HDL Cholesterol, Serum: 22 mg/dL (05.19.21 @ 08:36)     LDL Cholesterol Calculated: 66 mg/dL (05.19.21 @ 08:36)     DIET: CC  >50%

## 2023-05-25 NOTE — CONSULT NOTE ADULT - CONSULT REASON
Cellulitis
dm2 uncontrolled
57y A1C with Estimated Average Glucose Result: 8.3 % (05-24-23 @ 06:25)   diabetes mellitus uncontrolled type 2

## 2023-05-25 NOTE — DIETITIAN INITIAL EVALUATION ADULT - PERSON TAUGHT/METHOD
Written and verbal DM, heart healthy and weight loss nutrition therapy provided. Good understanding of material discussed. RDs name/phone number left with patient if questions/concerns arise.

## 2023-05-25 NOTE — DIETITIAN INITIAL EVALUATION ADULT - LAB (SPECIFY)
Lipid abnormalities are improving with treatment.  Nutritional counseling was provided.  Lipids will be reassessed in 3 months.   Lipid profile.

## 2023-05-25 NOTE — DIETITIAN INITIAL EVALUATION ADULT - PERTINENT MEDS FT
MEDICATIONS  (STANDING):  aspirin enteric coated 81 milliGRAM(s) Oral daily  atenolol  Tablet 25 milliGRAM(s) Oral daily  atorvastatin 40 milliGRAM(s) Oral at bedtime  ceFAZolin   IVPB 1000 milliGRAM(s) IV Intermittent every 8 hours  dextrose 50% Injectable 25 Gram(s) IV Push once  dextrose 50% Injectable 12.5 Gram(s) IV Push once  dextrose 50% Injectable 25 Gram(s) IV Push once  dextrose Oral Gel 15 Gram(s) Oral once  glucagon  Injectable 1 milliGRAM(s) IntraMuscular once  heparin   Injectable 5000 Unit(s) SubCutaneous every 8 hours  insulin lispro (ADMELOG) corrective regimen sliding scale   SubCutaneous three times a day before meals  insulin lispro (ADMELOG) corrective regimen sliding scale   SubCutaneous at bedtime  lactobacillus acidophilus 1 Tablet(s) Oral daily  losartan 25 milliGRAM(s) Oral daily  melatonin 5 milliGRAM(s) Oral at bedtime  metFORMIN 500 milliGRAM(s) Oral two times a day    MEDICATIONS  (PRN):  acetaminophen     Tablet .. 650 milliGRAM(s) Oral every 6 hours PRN Temp greater or equal to 38C (100.4F)  ALPRAZolam 0.5 milliGRAM(s) Oral every 8 hours PRN anxiety and/or sleep  traMADol 25 milliGRAM(s) Oral four times a day PRN Mild Pain (1 - 3)  traMADol 50 milliGRAM(s) Oral four times a day PRN Moderate Pain (4 - 6)

## 2023-05-25 NOTE — DIETITIAN INITIAL EVALUATION ADULT - NUTRITIONGOAL OUTCOME2
Pt to comply with rx therapeutic diet to facilitate gradual wt loss of 1-2lbs/week until BMI w/n more desirable range.

## 2023-05-26 ENCOUNTER — TRANSCRIPTION ENCOUNTER (OUTPATIENT)
Age: 58
End: 2023-05-26

## 2023-05-26 VITALS
TEMPERATURE: 98 F | RESPIRATION RATE: 18 BRPM | OXYGEN SATURATION: 93 % | SYSTOLIC BLOOD PRESSURE: 129 MMHG | HEART RATE: 58 BPM | DIASTOLIC BLOOD PRESSURE: 86 MMHG

## 2023-05-26 LAB
ANION GAP SERPL CALC-SCNC: 8 MMOL/L — SIGNIFICANT CHANGE UP (ref 5–17)
BUN SERPL-MCNC: 15 MG/DL — SIGNIFICANT CHANGE UP (ref 7–23)
CALCIUM SERPL-MCNC: 10 MG/DL — SIGNIFICANT CHANGE UP (ref 8.5–10.1)
CHLORIDE SERPL-SCNC: 103 MMOL/L — SIGNIFICANT CHANGE UP (ref 96–108)
CO2 SERPL-SCNC: 26 MMOL/L — SIGNIFICANT CHANGE UP (ref 22–31)
CREAT SERPL-MCNC: 0.85 MG/DL — SIGNIFICANT CHANGE UP (ref 0.5–1.3)
EGFR: 102 ML/MIN/1.73M2 — SIGNIFICANT CHANGE UP
GLUCOSE SERPL-MCNC: 131 MG/DL — HIGH (ref 70–99)
HCT VFR BLD CALC: 45.6 % — SIGNIFICANT CHANGE UP (ref 39–50)
HGB BLD-MCNC: 14.9 G/DL — SIGNIFICANT CHANGE UP (ref 13–17)
MCHC RBC-ENTMCNC: 28 PG — SIGNIFICANT CHANGE UP (ref 27–34)
MCHC RBC-ENTMCNC: 32.7 GM/DL — SIGNIFICANT CHANGE UP (ref 32–36)
MCV RBC AUTO: 85.6 FL — SIGNIFICANT CHANGE UP (ref 80–100)
NRBC # BLD: 0 /100 WBCS — SIGNIFICANT CHANGE UP (ref 0–0)
PLATELET # BLD AUTO: 291 K/UL — SIGNIFICANT CHANGE UP (ref 150–400)
POTASSIUM SERPL-MCNC: 4.2 MMOL/L — SIGNIFICANT CHANGE UP (ref 3.5–5.3)
POTASSIUM SERPL-SCNC: 4.2 MMOL/L — SIGNIFICANT CHANGE UP (ref 3.5–5.3)
RBC # BLD: 5.33 M/UL — SIGNIFICANT CHANGE UP (ref 4.2–5.8)
RBC # FLD: 14.6 % — HIGH (ref 10.3–14.5)
SODIUM SERPL-SCNC: 137 MMOL/L — SIGNIFICANT CHANGE UP (ref 135–145)
WBC # BLD: 8.73 K/UL — SIGNIFICANT CHANGE UP (ref 3.8–10.5)
WBC # FLD AUTO: 8.73 K/UL — SIGNIFICANT CHANGE UP (ref 3.8–10.5)

## 2023-05-26 PROCEDURE — 86140 C-REACTIVE PROTEIN: CPT

## 2023-05-26 PROCEDURE — 93005 ELECTROCARDIOGRAM TRACING: CPT

## 2023-05-26 PROCEDURE — 87086 URINE CULTURE/COLONY COUNT: CPT

## 2023-05-26 PROCEDURE — 96374 THER/PROPH/DIAG INJ IV PUSH: CPT

## 2023-05-26 PROCEDURE — 81001 URINALYSIS AUTO W/SCOPE: CPT

## 2023-05-26 PROCEDURE — 85730 THROMBOPLASTIN TIME PARTIAL: CPT

## 2023-05-26 PROCEDURE — 93971 EXTREMITY STUDY: CPT

## 2023-05-26 PROCEDURE — 99231 SBSQ HOSP IP/OBS SF/LOW 25: CPT

## 2023-05-26 PROCEDURE — 73590 X-RAY EXAM OF LOWER LEG: CPT

## 2023-05-26 PROCEDURE — 83036 HEMOGLOBIN GLYCOSYLATED A1C: CPT

## 2023-05-26 PROCEDURE — 99285 EMERGENCY DEPT VISIT HI MDM: CPT | Mod: 25

## 2023-05-26 PROCEDURE — 85025 COMPLETE CBC W/AUTO DIFF WBC: CPT

## 2023-05-26 PROCEDURE — 84145 PROCALCITONIN (PCT): CPT

## 2023-05-26 PROCEDURE — 36415 COLL VENOUS BLD VENIPUNCTURE: CPT

## 2023-05-26 PROCEDURE — 80053 COMPREHEN METABOLIC PANEL: CPT

## 2023-05-26 PROCEDURE — 83605 ASSAY OF LACTIC ACID: CPT

## 2023-05-26 PROCEDURE — 87040 BLOOD CULTURE FOR BACTERIA: CPT

## 2023-05-26 PROCEDURE — 82962 GLUCOSE BLOOD TEST: CPT

## 2023-05-26 PROCEDURE — 85610 PROTHROMBIN TIME: CPT

## 2023-05-26 PROCEDURE — 80048 BASIC METABOLIC PNL TOTAL CA: CPT

## 2023-05-26 PROCEDURE — 99232 SBSQ HOSP IP/OBS MODERATE 35: CPT

## 2023-05-26 PROCEDURE — 85027 COMPLETE CBC AUTOMATED: CPT

## 2023-05-26 PROCEDURE — 85652 RBC SED RATE AUTOMATED: CPT

## 2023-05-26 PROCEDURE — 71045 X-RAY EXAM CHEST 1 VIEW: CPT

## 2023-05-26 RX ORDER — LACTOBACILLUS ACIDOPHILUS 100MM CELL
1 CAPSULE ORAL
Qty: 0 | Refills: 0 | DISCHARGE
Start: 2023-05-26

## 2023-05-26 RX ORDER — ATORVASTATIN CALCIUM 80 MG/1
1 TABLET, FILM COATED ORAL
Qty: 0 | Refills: 0 | DISCHARGE
Start: 2023-05-26

## 2023-05-26 RX ORDER — NIFEDIPINE 30 MG
1 TABLET, EXTENDED RELEASE 24 HR ORAL
Qty: 0 | Refills: 0 | DISCHARGE

## 2023-05-26 RX ORDER — ATENOLOL 25 MG/1
1 TABLET ORAL
Qty: 0 | Refills: 0 | DISCHARGE
Start: 2023-05-26

## 2023-05-26 RX ORDER — ASPIRIN/CALCIUM CARB/MAGNESIUM 324 MG
1 TABLET ORAL
Qty: 0 | Refills: 0 | DISCHARGE
Start: 2023-05-26

## 2023-05-26 RX ORDER — CEPHALEXIN 500 MG
1 CAPSULE ORAL
Qty: 30 | Refills: 0
Start: 2023-05-26 | End: 2023-06-04

## 2023-05-26 RX ORDER — ALPRAZOLAM 0.25 MG
1 TABLET ORAL
Qty: 0 | Refills: 0 | DISCHARGE
Start: 2023-05-26

## 2023-05-26 RX ORDER — LOSARTAN POTASSIUM 100 MG/1
1 TABLET, FILM COATED ORAL
Qty: 0 | Refills: 0 | DISCHARGE
Start: 2023-05-26

## 2023-05-26 RX ORDER — ACETAMINOPHEN 500 MG
2 TABLET ORAL
Qty: 0 | Refills: 0 | DISCHARGE
Start: 2023-05-26

## 2023-05-26 RX ORDER — CEFTRIAXONE 500 MG/1
1000 INJECTION, POWDER, FOR SOLUTION INTRAMUSCULAR; INTRAVENOUS ONCE
Refills: 0 | Status: COMPLETED | OUTPATIENT
Start: 2023-05-26 | End: 2023-05-26

## 2023-05-26 RX ORDER — METFORMIN HYDROCHLORIDE 850 MG/1
1 TABLET ORAL
Qty: 60 | Refills: 0
Start: 2023-05-26 | End: 2023-06-24

## 2023-05-26 RX ORDER — IRBESARTAN 75 MG/1
1 TABLET ORAL
Qty: 0 | Refills: 0 | DISCHARGE

## 2023-05-26 RX ADMIN — Medication 81 MILLIGRAM(S): at 11:48

## 2023-05-26 RX ADMIN — Medication 1 TABLET(S): at 11:47

## 2023-05-26 RX ADMIN — Medication 100 MILLIGRAM(S): at 00:15

## 2023-05-26 RX ADMIN — Medication 100 MILLIGRAM(S): at 09:08

## 2023-05-26 RX ADMIN — CEFTRIAXONE 100 MILLIGRAM(S): 500 INJECTION, POWDER, FOR SOLUTION INTRAMUSCULAR; INTRAVENOUS at 15:43

## 2023-05-26 RX ADMIN — METFORMIN HYDROCHLORIDE 500 MILLIGRAM(S): 850 TABLET ORAL at 05:36

## 2023-05-26 RX ADMIN — HEPARIN SODIUM 5000 UNIT(S): 5000 INJECTION INTRAVENOUS; SUBCUTANEOUS at 05:36

## 2023-05-26 NOTE — CHART NOTE - NSCHARTNOTEFT_GEN_A_CORE
Do you have Advance Directives (HCP / LV / Organ donation / Documentation of oral advance Directive):   (  x  )  yes    (      )    NO                                                                            Do you have LV - Living will :                                                                                                                                             (  x  )  yes    (      )   No    Do you have HCP - Health Care Proxy:                                                                                                                            (   x  )  yes   (       ) N0    Do you have DNR- Do Not Resuscitate :                                                                                                                           (      )  yes  (      x  )  No    Do you have DNI- Do Not intubate  :                                                                                                                               (      )  yes   (    x   ) No    Do you have MOLST - Medical orders for Life sustaining treatment  :                                                                    (      ) yes    (   x    ) No    Decision Maker :  ( x    ) Patient     (      )  HCA   (     ) Public Health Law Surrogate     (      ) Surrogate  (       ) Guardian    Goals of Care :  (  x    )   Complete Care     (       ) No Limitations                              (       )   Comfort Care       (       )  Hospice                               (      )   Limited medical Intervention / s    Medical Interventions :   (    x    )   CPR       (        )  DNR                                               (     x   )  Intubation with MV - Mechanical Ventilation  (   x   ) BIPAP/CPAP    (         )   DNI                                               (    x     )  Artificial Nutrition -  IVF, TPN / PPN, Tube Feeds             (         )   No Feeding Tube                                                (    x    ) Use Antibiotics                         (          ) No Antibiotics                                                (     x    ) Blood and Blood Products     (         )   No Blood or Blood products                                                (     x     )  Dialysis                                    (         )  No Dialysis                                                (          )  Medical Management only  (         )  No Invasive Interventions or Surgery  Time spent :                        (    x   ) upto 30 minutes                       (           )   more than 30 minutes  ACP reviewed and discussed

## 2023-05-26 NOTE — DISCHARGE NOTE PROVIDER - NSDCCPCAREPLAN_GEN_ALL_CORE_FT
PRINCIPAL DISCHARGE DIAGNOSIS  Diagnosis: Cellulitis  Assessment and Plan of Treatment: follow up with ID MD Dr. keane

## 2023-05-26 NOTE — DISCHARGE NOTE PROVIDER - CARE PROVIDER_API CALL
JAY SMITH  [address] [place] [address]  Phone: [phone]  Fax: [phone]  Follow Up Time:     Jabier Quiles  Infectious Disease  [address] [place] [address]  Phone: [phone]  Fax: [phone]  Follow Up Time:     Perlman, Craig Douglas  Internal Medicine  [address] [place] [address] [address]  [place] [address]  Phone: [phone]  Fax: [phone]  Follow Up Time:

## 2023-05-26 NOTE — DISCHARGE NOTE PROVIDER - HOSPITAL COURSE
admitted for RLE cellulitis  ABX per ID  DM - uncontrolled  increased metformin dosing  DC after ID and endo clearance

## 2023-05-26 NOTE — DISCHARGE NOTE PROVIDER - PROVIDER TOKENS
PROVIDER:[TOKEN:[[id number]]],PROVIDER:[TOKEN:[[id number]]],PROVIDER:[TOKEN:[[id number]]]

## 2023-05-26 NOTE — DISCHARGE NOTE PROVIDER - NSDCMRMEDTOKEN_GEN_ALL_CORE_FT
Anesthesia Post Evaluation    Patient: Silvana Quezada    Procedure(s) Performed: Procedure(s) (LRB):  CYSTOSCOPY,WITH BOTULINUM TOXIN INJECTION (N/A)    Final Anesthesia Type: general    Patient location during evaluation: PACU  Patient participation: Yes- Able to Participate  Level of consciousness: awake and alert and oriented  Post-procedure vital signs: reviewed and stable  Pain management: adequate  Airway patency: patent    PONV status at discharge: No PONV  Anesthetic complications: no      Cardiovascular status: blood pressure returned to baseline and hemodynamically stable  Respiratory status: unassisted  Hydration status: euvolemic  Follow-up not needed.          Vitals Value Taken Time   /54 12/19/2019  9:31 AM   Temp 36.3 °C (97.3 °F) 12/19/2019  9:00 AM   Pulse 64 12/19/2019  9:44 AM   Resp 20 12/19/2019  9:44 AM   SpO2 100 % 12/19/2019  9:44 AM   Vitals shown include unvalidated device data.      No case tracking events are documented in the log.      Pain/Yaakov Score: No data recorded       acetaminophen 325 mg oral tablet: 2 tab(s) orally every 6 hours As needed Temp greater or equal to 38C (100.4F)  ALPRAZolam 0.5 mg oral tablet: 1 tab(s) orally every 8 hours As needed anxiety and/or sleep  aspirin 81 mg oral delayed release tablet: 1 tab(s) orally once a day  atenolol 25 mg oral tablet: 1 tab(s) orally once a day  atorvastatin 40 mg oral tablet: 1 tab(s) orally once a day (at bedtime)  Freestyle Troy 2 sensors: Apply 1 sensor every 14 days  lactobacillus acidophilus oral capsule: 1 cap(s) orally once a day  losartan 25 mg oral tablet: 1 tab(s) orally once a day  metFORMIN 850 mg oral tablet: 1 tab(s) orally 2 times a day   acetaminophen 325 mg oral tablet: 2 tab(s) orally every 6 hours As needed Temp greater or equal to 38C (100.4F)  ALPRAZolam 0.5 mg oral tablet: 1 tab(s) orally every 8 hours As needed anxiety and/or sleep  aspirin 81 mg oral delayed release tablet: 1 tab(s) orally once a day  atenolol 25 mg oral tablet: 1 tab(s) orally once a day  atorvastatin 40 mg oral tablet: 1 tab(s) orally once a day (at bedtime)  cephalexin 750 mg oral capsule: 1 cap(s) orally 3 times a day  Freestyle Troy 2 sensors: Apply 1 sensor every 14 days  irbesartan 150 mg oral tablet: 1 orally once a day  lactobacillus acidophilus oral capsule: 1 cap(s) orally once a day  metFORMIN 850 mg oral tablet: 1 tab(s) orally 2 times a day  NIFEdipine (Eqv-Adalat CC) 60 mg oral tablet, extended release: 1 orally once a day

## 2023-05-26 NOTE — PROGRESS NOTE ADULT - ASSESSMENT
57 yr old male w pmHx type 2 diabetes, htn, presenting with cellulitis failing outpatient therapy
Physical Exam:   Vital Signs Last 24 Hrs  T(C): 36.5 (26 May 2023 11:56), Max: 37.1 (26 May 2023 04:57)  T(F): 97.7 (26 May 2023 11:56), Max: 98.7 (26 May 2023 04:57)  HR: 58 (26 May 2023 11:56) (52 - 59)  BP: 129/86 (26 May 2023 11:56) (109/72 - 148/93)  BP(mean): --  RR: 18 (26 May 2023 11:56) (18 - 19)  SpO2: 93% (26 May 2023 11:56) (93% - 96%)    Parameters below as of 26 May 2023 11:56  Patient On (Oxygen Delivery Method): room air       CAPILLARY BLOOD GLUCOSE      POCT Blood Glucose.: 134 mg/dL (26 May 2023 12:19)  POCT Blood Glucose.: 140 mg/dL (26 May 2023 08:15)  POCT Blood Glucose.: 137 mg/dL (25 May 2023 20:58)  POCT Blood Glucose.: 128 mg/dL (25 May 2023 17:04)      Cholesterol, Serum: 113 mg/dL (05.19.21 @ 08:36)     HDL Cholesterol, Serum: 22 mg/dL (05.19.21 @ 08:36)     LDL Cholesterol Calculated: 66 mg/dL (05.19.21 @ 08:36)     DIET: CC  >50%

## 2023-05-26 NOTE — PROGRESS NOTE ADULT - SUBJECTIVE AND OBJECTIVE BOX
[facility]  INFECTIOUS DISEASES   [address] [place] [address]  Tel: [phone]     Fax: [phone]  ========================================================  MD Alvin Acevedo Kaushal, MD Cho, Michelle, MD Sunjit, Jaspal, MD  ========================================================    [id number]  KAREN VAZ     Follow up: right leg cellulitis     Leg swelling, warmth, tenderness and erythema better. No fever.   No overnight event.     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, social EtOH, no drugs, owns a  company     FAMILY HISTORY: Noncontributory     Allergies  No Known Allergies    Antibiotics:  MEDICATIONS  (STANDING):  ceFAZolin   IVPB 1000 milliGRAM(s) IV Intermittent every 8 hours  dextrose 50% Injectable 25 Gram(s) IV Push once  dextrose 50% Injectable 12.5 Gram(s) IV Push once  dextrose 50% Injectable 25 Gram(s) IV Push once  dextrose Oral Gel 15 Gram(s) Oral once  glucagon  Injectable 1 milliGRAM(s) IntraMuscular once  heparin   Injectable 5000 Unit(s) SubCutaneous every 12 hours  insulin lispro (ADMELOG) corrective regimen sliding scale   SubCutaneous three times a day before meals  insulin lispro (ADMELOG) corrective regimen sliding scale   SubCutaneous at bedtime  lactobacillus acidophilus 1 Tablet(s) Oral daily  lisinopril 2.5 milliGRAM(s) Oral daily  melatonin 5 milliGRAM(s) Oral at bedtime    MEDICATIONS  (PRN):  acetaminophen     Tablet .. 650 milliGRAM(s) Oral every 6 hours PRN Temp greater or equal to 38C (100.4F)  traMADol 25 milliGRAM(s) Oral four times a day PRN Mild Pain (1 - 3)  traMADol 50 milliGRAM(s) Oral four times a day PRN Moderate Pain (4 - 6)     REVIEW OF SYSTEMS:  CONSTITUTIONAL:  No Fever or chills  HEENT:  No diplopia or blurred vision.  No sore throat or runny nose.  CARDIOVASCULAR:  No chest pain or SOB.  RESPIRATORY:  No cough, shortness of breath, PND or orthopnea.  GASTROINTESTINAL:  No nausea, vomiting or diarrhea.  GENITOURINARY:  No dysuria, frequency or urgency. No Blood in urine  MUSCULOSKELETAL:  no joint aches, no muscle pain  SKIN:  No change in skin, hair or nails.  NEUROLOGIC:  No paresthesias or weakness.  PSYCHIATRIC:  No disorder of thought or mood.  ENDOCRINE:  No heat or cold intolerance, polyuria or polydipsia.  HEMATOLOGICAL:  No easy bruising or bleeding.     Physical Exam:  Vital Signs Last 24 Hrs  T(C): 36.8 (25 May 2023 04:55), Max: 37.7 (24 May 2023 21:24)  T(F): 98.2 (25 May 2023 04:55), Max: 99.8 (24 May 2023 21:24)  HR: 62 (25 May 2023 09:26) (56 - 78)  BP: 151/92 (25 May 2023 09:26) (127/80 - 151/92)  BP(mean): --  RR: 16 (25 May 2023 04:55) (16 - 18)  SpO2: 96% (25 May 2023 04:55) (93% - 96%)  Parameters below as of 25 May 2023 04:55  Patient On (Oxygen Delivery Method): room air  GEN: NAD, obese   HEENT: normocephalic and atraumatic. EOMI. PERRL.    NECK: Supple.  No lymphadenopathy   LUNGS: Clear to auscultation.  HEART: Regular rate and rhythm without murmur.  ABDOMEN: Soft, nontender, and nondistended.  Positive bowel sounds.    : No CVA tenderness  EXTREMITIES: Right lower leg with erythema, warmth and swelling, mild tenderness  NEUROLOGIC: grossly intact.  PSYCHIATRIC: Appropriate affect .  SKIN: as above in legs, Inguinal area with erythema, no open ulcer, small areas with maceration       Labs:                        14.3   9.54  )-----------( 237      ( 25 May 2023 07:30 )             44.4     05-25    137  |  107  |  11  ----------------------------<  148<H>  3.8   |  22  |  0.69    Ca    9.4      25 May 2023 07:30    TPro  7.7  /  Alb  3.2<L>  /  TBili  0.5  /  DBili  x   /  AST  26  /  ALT  41  /  AlkPhos  65  05-23    Culture - Urine (collected 05-23-23 @ 22:30)  Source: Clean Catch Clean Catch (Midstream)  Final Report (05-25-23 @ 06:31):    No growth    Culture - Blood (collected 05-23-23 @ 19:40)  Source: .Blood Blood-Peripheral    Culture - Blood (collected 05-23-23 @ 19:15)  Source: .Blood Blood-Peripheral    WBC Count: 9.54 K/uL (05-25-23 @ 07:30)  WBC Count: 10.91 K/uL (05-24-23 @ 06:25)  WBC Count: 11.22 K/uL (05-23-23 @ 19:58)    Creatinine, Serum: 0.69 mg/dL (05-25-23 @ 07:30)  Creatinine, Serum: 1.10 mg/dL (05-23-23 @ 19:58)    C-Reactive Protein, Serum: 106 mg/L (05-24-23 @ 06:25)    Sedimentation Rate, Erythrocyte: 26 mm/hr (05-24-23 @ 06:25)    Procalcitonin, Serum: 0.08 ng/mL (05-24-23 @ 06:25)    All imaging and other data have been reviewed.  < from: US Duplex Venous Lower Ext Ltd, Right (05.23.23 @ 21:56) >  IMPRESSION:  No evidence of right lower extremity deep venous thrombosis.    Assessment and Plan:   58yo man with PMH of HTN and DM2 was admitted on 5/23 with right lower leg cellulitis.   Source could be skin break in feet or inguinal area that has rash causing cellulitis in leg. Will cover common bacteria MSSA and Sterp.   Procal is low and CRP is high.     Will improvement in RLE cellulitis, all symptoms improving.     1- RLE cellulitis  - Blood and urine Cultures are NGTD  - Will Monitor Tmax and WBC both normal today   - Will continue cefazolin 2gm q8    2- Tinea cruris   - keep the area clean  - One dose of oral fluconazole 150mg given yesterday (can repeat weekly for 3 more weeks)  - Topical antifungal bid     Will follow.    Jabier Quiles MD  Division of Infectious Diseases   Please call ID service at [phone] with any question.    75 minutes spent on total encounter assessing patient, examination, chart review, counseling and coordinating care by the attending physician/nurse/care manager.  
[facility]  INFECTIOUS DISEASES   [address] [place] [address]  Tel: [phone]     Fax: [phone]  ========================================================  MD Alvin Acevedo Kaushal, MD Cho, Michelle, MD Sunjit, Jaspal, MD  ========================================================    [id number]  KAREN VAZ     Follow up: right leg cellulitis     Leg swelling, warmth, tenderness and erythema better. No fever.   No overnight event.     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, social EtOH, no drugs, owns a  company     FAMILY HISTORY: Noncontributory     Allergies  No Known Allergies    Antibiotics:  MEDICATIONS  (STANDING):  ceFAZolin   IVPB 1000 milliGRAM(s) IV Intermittent every 8 hours  dextrose 50% Injectable 25 Gram(s) IV Push once  dextrose 50% Injectable 12.5 Gram(s) IV Push once  dextrose 50% Injectable 25 Gram(s) IV Push once  dextrose Oral Gel 15 Gram(s) Oral once  glucagon  Injectable 1 milliGRAM(s) IntraMuscular once  heparin   Injectable 5000 Unit(s) SubCutaneous every 12 hours  insulin lispro (ADMELOG) corrective regimen sliding scale   SubCutaneous three times a day before meals  insulin lispro (ADMELOG) corrective regimen sliding scale   SubCutaneous at bedtime  lactobacillus acidophilus 1 Tablet(s) Oral daily  lisinopril 2.5 milliGRAM(s) Oral daily  melatonin 5 milliGRAM(s) Oral at bedtime    MEDICATIONS  (PRN):  acetaminophen     Tablet .. 650 milliGRAM(s) Oral every 6 hours PRN Temp greater or equal to 38C (100.4F)  traMADol 25 milliGRAM(s) Oral four times a day PRN Mild Pain (1 - 3)  traMADol 50 milliGRAM(s) Oral four times a day PRN Moderate Pain (4 - 6)     REVIEW OF SYSTEMS:  CONSTITUTIONAL:  No Fever or chills  HEENT:  No diplopia or blurred vision.  No sore throat or runny nose.  CARDIOVASCULAR:  No chest pain or SOB.  RESPIRATORY:  No cough, shortness of breath, PND or orthopnea.  GASTROINTESTINAL:  No nausea, vomiting or diarrhea.  GENITOURINARY:  No dysuria, frequency or urgency. No Blood in urine  MUSCULOSKELETAL:  no joint aches, no muscle pain  SKIN:  No change in skin, hair or nails.  NEUROLOGIC:  No paresthesias or weakness.  PSYCHIATRIC:  No disorder of thought or mood.  ENDOCRINE:  No heat or cold intolerance, polyuria or polydipsia.  HEMATOLOGICAL:  No easy bruising or bleeding.     Physical Exam:  Vital Signs Last 24 Hrs  T(C): 37.1 (26 May 2023 04:57), Max: 37.1 (26 May 2023 04:57)  T(F): 98.7 (26 May 2023 04:57), Max: 98.7 (26 May 2023 04:57)  HR: 52 (26 May 2023 04:57) (52 - 59)  BP: 109/72 (26 May 2023 04:57) (109/72 - 148/93)  BP(mean): --  RR: 18 (26 May 2023 04:57) (18 - 19)  SpO2: 95% (26 May 2023 04:57) (93% - 96%)  Parameters below as of 26 May 2023 04:57  Patient On (Oxygen Delivery Method): room air  GEN: NAD, obese   HEENT: normocephalic and atraumatic. EOMI. PERRL.    NECK: Supple.  No lymphadenopathy   LUNGS: Clear to auscultation.  HEART: Regular rate and rhythm without murmur.  ABDOMEN: Soft, nontender, and nondistended.  Positive bowel sounds.    : No CVA tenderness  EXTREMITIES: Right lower leg erythema, warmth, swelling and tenderness all improved   NEUROLOGIC: grossly intact.  PSYCHIATRIC: Appropriate affect .  SKIN: as above in legs, Inguinal area with erythema, no open ulcer, small areas with maceration     Labs:                        14.3   9.54  )-----------( 237      ( 25 May 2023 07:30 )             44.4     05-25    137  |  107  |  11  ----------------------------<  148<H>  3.8   |  22  |  0.69    Ca    9.4      25 May 2023 07:30    Culture - Blood (collected 05-24-23 @ 06:25)  Source: .Blood Blood    Culture - Blood (collected 05-24-23 @ 06:00)  Source: .Blood Blood    Culture - Urine (collected 05-23-23 @ 22:30)  Source: Clean Catch Clean Catch (Midstream)  Final Report (05-25-23 @ 06:31):    No growth    Culture - Blood (collected 05-23-23 @ 19:40)  Source: .Blood Blood-Peripheral    Culture - Blood (collected 05-23-23 @ 19:15)  Source: .Blood Blood-Peripheral    WBC Count: 9.54 K/uL (05-25-23 @ 07:30)  WBC Count: 10.91 K/uL (05-24-23 @ 06:25)  WBC Count: 11.22 K/uL (05-23-23 @ 19:58)    Creatinine, Serum: 0.69 mg/dL (05-25-23 @ 07:30)  Creatinine, Serum: 1.10 mg/dL (05-23-23 @ 19:58)    C-Reactive Protein, Serum: 106 mg/L (05-24-23 @ 06:25)    Sedimentation Rate, Erythrocyte: 26 mm/hr (05-24-23 @ 06:25)    Procalcitonin, Serum: 0.08 ng/mL (05-24-23 @ 06:25)    All imaging and other data have been reviewed.  < from: US Duplex Venous Lower Ext Ltd, Right (05.23.23 @ 21:56) >  IMPRESSION:  No evidence of right lower extremity deep venous thrombosis.    Assessment and Plan:   56yo man with PMH of HTN and DM2 was admitted on 5/23 with right lower leg cellulitis.   Source could be skin break in feet or inguinal area that has rash causing cellulitis in leg. Will cover common bacteria MSSA and Sterp.   Procal is low and CRP is high.     Improved RLE cellulitis, all symptoms better. No fever.     1- RLE cellulitis  - Blood and urine Cultures are NGTD  - Tmax and WBC both normal today   - Can switch cefazolin to oral Keflex 500mg q8 to complete total 2 weeks from start  - Discussed about possible side effects of ABx including diarrhea and cdiff   - Follow up with PCP in one week    2- Tinea cruris   - keep the area clean  - One dose of oral fluconazole 150mg given on 5/24 (can repeat weekly for 3 more weeks)  - Topical antifungal bid     Will sign off please call with any question.     Jabier Quiles MD  Division of Infectious Diseases   Please call ID service at [phone] with any question.    75 minutes spent on total encounter assessing patient, examination, chart review, counseling and coordinating care by the attending physician/nurse/care manager.  
Patient is a 57y old  Male who presents with a chief complaint of cellulitis (26 May 2023 10:45)    updates: pt report no issues over night, tolerating CC diet, has not required correctional ISS. reviewed BG in hospital, discussed BG goals at home, reducing A1c <7%. pt reports Trulicty script not approved, 1200$, discussed alternative mediction options DDP4i, pt refuses script at this time, states he will talk with his PCP, states metformin 500mg BID increased to 850mg BID, educated patient to max out at 1000mg BID, take w/ meals to decreased GI sideeffects, maintain good hydration. pt reports CGM was approved, will  and place by himself, offered to set up prior to discharge, patient refused. referred to education videos and downloading estephania. reviewed CC diet, prioritizing nonstarchy vegetables and protein. pt has list of [facility] endos, encouraged f/u and lifestyle modifications, 150min/week moderate intensity activity, recheck A1c in 6 months. importnace of glycemic control for cellulitis healing    HPI:  57 M hx DM, HTN c/o right lower leg pain, redness. Sent from urgent care due to cellulitis. States for the past few days he had low grade fever, yesterday saw a small red spot by his right medial ankle which spread. Has been taking naproxen, last dose this morning. Admits that a few days ago he scraped his ankle on a box. Works at [employer] but denies possibility of chemical exposure to his leg (24 May 2023 11:04)      PAST MEDICAL & SURGICAL HISTORY:  Type 2 diabetes mellitus      HTN (hypertension)      Dyslipidemia      Other:	      Allergies    No Known Allergies    Intolerances        MEDICATIONS  (STANDING):  aspirin enteric coated 81 milliGRAM(s) Oral daily  atenolol  Tablet 25 milliGRAM(s) Oral daily  atorvastatin 40 milliGRAM(s) Oral at bedtime  cefTRIAXone   IVPB 1000 milliGRAM(s) IV Intermittent once  dextrose 50% Injectable 25 Gram(s) IV Push once  dextrose 50% Injectable 12.5 Gram(s) IV Push once  dextrose 50% Injectable 25 Gram(s) IV Push once  dextrose Oral Gel 15 Gram(s) Oral once  glucagon  Injectable 1 milliGRAM(s) IntraMuscular once  heparin   Injectable 5000 Unit(s) SubCutaneous every 8 hours  insulin lispro (ADMELOG) corrective regimen sliding scale   SubCutaneous three times a day before meals  insulin lispro (ADMELOG) corrective regimen sliding scale   SubCutaneous at bedtime  lactobacillus acidophilus 1 Tablet(s) Oral daily  losartan 25 milliGRAM(s) Oral daily  melatonin 5 milliGRAM(s) Oral at bedtime  metFORMIN 500 milliGRAM(s) Oral two times a day      
Date of Service 23 @ 13:35    Patient is a 57y old  Male who presents with a chief complaint of leg pain with infection    INTERVAL /OVERNIGHT EVENTS: leg pain and redness improving    MEDICATIONS  (STANDING):  aspirin enteric coated 81 milliGRAM(s) Oral daily  atenolol  Tablet 25 milliGRAM(s) Oral daily  atorvastatin 40 milliGRAM(s) Oral at bedtime  ceFAZolin   IVPB 1000 milliGRAM(s) IV Intermittent every 8 hours  dextrose 50% Injectable 25 Gram(s) IV Push once  dextrose 50% Injectable 12.5 Gram(s) IV Push once  dextrose 50% Injectable 25 Gram(s) IV Push once  dextrose Oral Gel 15 Gram(s) Oral once  glucagon  Injectable 1 milliGRAM(s) IntraMuscular once  heparin   Injectable 5000 Unit(s) SubCutaneous every 8 hours  insulin lispro (ADMELOG) corrective regimen sliding scale   SubCutaneous three times a day before meals  insulin lispro (ADMELOG) corrective regimen sliding scale   SubCutaneous at bedtime  lactobacillus acidophilus 1 Tablet(s) Oral daily  losartan 25 milliGRAM(s) Oral daily  melatonin 5 milliGRAM(s) Oral at bedtime  metFORMIN 500 milliGRAM(s) Oral two times a day    MEDICATIONS  (PRN):  acetaminophen     Tablet .. 650 milliGRAM(s) Oral every 6 hours PRN Temp greater or equal to 38C (100.4F)  ALPRAZolam 0.5 milliGRAM(s) Oral every 8 hours PRN anxiety and/or sleep  traMADol 25 milliGRAM(s) Oral four times a day PRN Mild Pain (1 - 3)  traMADol 50 milliGRAM(s) Oral four times a day PRN Moderate Pain (4 - 6)      Allergies    No Known Allergies    Intolerances        REVIEW OF SYSTEMS:  CONSTITUTIONAL: No fever, weight loss, or fatigue  EYES: No eye pain, visual disturbances, or discharge  ENMT:  No difficulty hearing, tinnitus, vertigo; No sinus or throat pain  NECK: No pain or stiffness  RESPIRATORY: No cough, wheezing, chills or hemoptysis; No shortness of breath  CARDIOVASCULAR: No chest pain, palpitations, dizziness, or leg swelling  GASTROINTESTINAL: No abdominal or epigastric pain. No nausea, vomiting, or hematemesis; No diarrhea or constipation. No melena or hematochezia.  GENITOURINARY: No dysuria, frequency, hematuria, or incontinence  NEUROLOGICAL: No headaches, memory loss, loss of strength, numbness, or tremors  SKIN: RLE redness  LYMPH NODES: No enlarged glands  ENDOCRINE: No heat or cold intolerance; No hair loss; No polydipsia or polyuria  MUSCULOSKELETAL: No joint pain or swelling; No muscle, back, or extremity pain  PSYCHIATRIC: No depression, anxiety, mood swings, or difficulty sleeping  HEME/LYMPH: No easy bruising, or bleeding gums  ALLERGY AND IMMUNOLOGIC: No hives or eczema    Vital Signs Last 24 Hrs  T(C): 36.8 (25 May 2023 12:58), Max: 37.7 (24 May 2023 21:24)  T(F): 98.2 (25 May 2023 12:58), Max: 99.8 (24 May 2023 21:24)  HR: 54 (25 May 2023 12:58) (54 - 78)  BP: 144/92 (25 May 2023 12:58) (127/80 - 151/92)  BP(mean): --  RR: 18 (25 May 2023 12:58) (16 - 18)  SpO2: 93% (25 May 2023 12:58) (93% - 96%)    Parameters below as of 25 May 2023 12:58  Patient On (Oxygen Delivery Method): room air        PHYSICAL EXAM:  GENERAL: NAD, well-groomed, well-developed  HEAD:  Atraumatic, Normocephalic  EYES: EOMI, PERRLA, conjunctiva and sclera clear  ENMT: No tonsillar erythema, exudates, or enlargement; Moist mucous membranes, Good dentition, No lesions  NECK: Supple, No JVD, Normal thyroid  NERVOUS SYSTEM:  Alert & Oriented X3, Good concentration; Motor Strength 5/5 B/L upper and lower extremities; DTRs 2+ intact and symmetric  CHEST/LUNG: Clear to auscultation bilaterally; No rales, rhonchi, wheezing, or rubs  HEART: Regular rate and rhythm; No murmurs, rubs, or gallops  ABDOMEN: Soft, Nontender, Nondistended; Bowel sounds present  EXTREMITIES:  2+ Peripheral Pulses, No clubbing, cyanosis, or edema  LYMPH: No lymphadenopathy noted  SKIN: RLE erythema    LABS:                        14.3   9.54  )-----------( 237      ( 25 May 2023 07:30 )             44.4     25 May 2023 07:30    137    |  107    |  11     ----------------------------<  148    3.8     |  22     |  0.69     Ca    9.4        25 May 2023 07:30      PT/INR - ( 23 May 2023 19:58 )   PT: 13.8 sec;   INR: 1.18 ratio         PTT - ( 23 May 2023 19:58 )  PTT:28.8 sec  Urinalysis Basic - ( 23 May 2023 22:30 )    Color: Yellow / Appearance: Clear / S.010 / pH: x  Gluc: x / Ketone: Negative  / Bili: Negative / Urobili: Negative   Blood: x / Protein: 15 / Nitrite: Negative   Leuk Esterase: Negative / RBC: 3-5 /HPF / WBC 0-2   Sq Epi: x / Non Sq Epi: x / Bacteria: Occasional      CAPILLARY BLOOD GLUCOSE      POCT Blood Glucose.: 148 mg/dL (25 May 2023 12:22)  POCT Blood Glucose.: 142 mg/dL (25 May 2023 08:20)  POCT Blood Glucose.: 147 mg/dL (24 May 2023 21:31)  POCT Blood Glucose.: 170 mg/dL (24 May 2023 17:19)      RADIOLOGY & ADDITIONAL TESTS:    Notes Reviewed:  [x ] YES  [ ] NO    Care Discussed with Consultants/Other Providers [x ] YES  [ ] NO

## 2023-05-26 NOTE — DISCHARGE NOTE NURSING/CASE MANAGEMENT/SOCIAL WORK - PATIENT PORTAL LINK FT
You can access the FollowMyHealth Patient Portal offered by [facility] by registering at the following website: [email or web address]. By joining [employer]’s FollowMyHealth portal, you will also be able to view your health information using other applications (apps) compatible with our system.

## 2023-05-26 NOTE — PROGRESS NOTE ADULT - PROBLEM SELECTOR PLAN 1
iv ancef per ID  id eval with Dr. diya mcclendon
Type 2 A1c 8.3% adm cellulitis  Recommend endocrine-Perlman onconsult  FU appt: TBA  DSC recommendations: return to home increase regimen and glucose monitoring, lifestyle modifications  diabetes education provided as documented above  Diabetes support info and cell # [phone] given   Goal 100-180 mg/dL; 140-180 mg/dL in critical care areas

## 2023-05-26 NOTE — DISCHARGE NOTE NURSING/CASE MANAGEMENT/SOCIAL WORK - NSDCPEFALRISK_GEN_ALL_CORE
For information on Fall & Injury Prevention, visit: [email or web address] OR  [email or web address] OR  https://www.cdc.gov/steadi/patient.html

## 2023-05-29 LAB
CULTURE RESULTS: SIGNIFICANT CHANGE UP
SPECIMEN SOURCE: SIGNIFICANT CHANGE UP

## 2023-05-30 NOTE — DIETITIAN INITIAL EVALUATION ADULT - BODY MASS INDEX
Detail Level: Zone Continue Regimen: hydrocortisone 1 % topical cream BID\\nApply to affected areas twice daily for 2 weeks on then 2 weeks off.  Repeat as directed 36.2

## 2024-04-12 NOTE — PATIENT PROFILE ADULT - FUNCTIONAL SCREEN CURRENT LEVEL: COMMUNICATION, MLM
Writer called patient again to discuss. No answer. Line kept ringing. Labs canceled for pre appt labs. Will re enter if he reschedules.    0 = understands/communicates without difficulty